# Patient Record
(demographics unavailable — no encounter records)

---

## 2024-10-31 NOTE — HISTORY OF PRESENT ILLNESS
[Postpartum Follow Up] : postpartum follow up [Complications:___] : complications include: [unfilled] [Delivery Date: ___] : on [unfilled] [] : delivered by vaginal delivery [Male] : Delivery History: baby boy [Wt. ___] : weighing [unfilled] [FreeTextEntry8] : x1 week PPA/BP check. No complaints. BPs at home have been wnl.  [de-identified] : gestational age 40 weeks [de-identified] : pumping  [de-identified] :       Abdomen soft and non-tender, uterus not palpable on abdominal exam.  [de-identified] : +leg edema. Light bleeding.   [de-identified] : Continue monitoring BP at home and call if any elevation. Return x6 week PP check.

## 2024-10-31 NOTE — HISTORY OF PRESENT ILLNESS
[Postpartum Follow Up] : postpartum follow up [Complications:___] : complications include: [unfilled] [Delivery Date: ___] : on [unfilled] [] : delivered by vaginal delivery [Male] : Delivery History: baby boy [Wt. ___] : weighing [unfilled] [FreeTextEntry8] : x1 week PPA/BP check. No complaints. BPs at home have been wnl.  [de-identified] : gestational age 40 weeks [de-identified] : pumping  [de-identified] : +leg edema. Light bleeding.   [de-identified] :       Abdomen soft and non-tender, uterus not palpable on abdominal exam.  [de-identified] : Continue monitoring BP at home and call if any elevation. Return x6 week PP check.

## 2024-10-31 NOTE — END OF VISIT
[TextEntry] : IEsther, am scribing for and the presence of Dr. Lucina Meza the following sections HISTORY OF PRESENT ILLNESS, PAST MEDICAL/FAMILY/SOCIAL HISTORY; REVIEW OF SYSTEMS; PHYSICAL EXAM; DISPOSITION.

## 2024-12-02 NOTE — HISTORY OF PRESENT ILLNESS
[Postpartum Follow Up] : postpartum follow up [Complications:___] : complications include: [unfilled] [Delivery Date: ___] : on [unfilled] [] : delivered by vaginal delivery [Male] : Delivery History: baby boy [Wt. ___] : weighing [unfilled] [Intended Contraception] : Intended Contraception: [Condoms] : condoms [Back to Normal] : is back to normal in size [Normal] : the vagina was normal [FreeTextEntry8] : X6 week PPA. BPs at home have been normal.  [de-identified] : gestational age 40 weeks [de-identified] : pumping  [de-identified] :       Abdomen soft and non-tender, uterus not palpable on abdominal exam.  [de-identified] : Return x3-4 months for annual.

## 2024-12-02 NOTE — HISTORY OF PRESENT ILLNESS
[Postpartum Follow Up] : postpartum follow up [Complications:___] : complications include: [unfilled] [Delivery Date: ___] : on [unfilled] [] : delivered by vaginal delivery [Male] : Delivery History: baby boy [Wt. ___] : weighing [unfilled] [Intended Contraception] : Intended Contraception: [Condoms] : condoms [Back to Normal] : is back to normal in size [Normal] : the vagina was normal [FreeTextEntry8] : X6 week PPA. BPs at home have been normal.  [de-identified] : gestational age 40 weeks [de-identified] : pumping  [de-identified] :       Abdomen soft and non-tender, uterus not palpable on abdominal exam.  [de-identified] : Return x3-4 months for annual.